# Patient Record
Sex: FEMALE
[De-identification: names, ages, dates, MRNs, and addresses within clinical notes are randomized per-mention and may not be internally consistent; named-entity substitution may affect disease eponyms.]

---

## 2019-04-01 PROBLEM — Z00.00 ENCOUNTER FOR PREVENTIVE HEALTH EXAMINATION: Status: ACTIVE | Noted: 2019-04-01

## 2019-04-02 ENCOUNTER — APPOINTMENT (OUTPATIENT)
Dept: MAMMOGRAPHY | Facility: IMAGING CENTER | Age: 54
End: 2019-04-02
Payer: COMMERCIAL

## 2019-04-02 ENCOUNTER — OUTPATIENT (OUTPATIENT)
Dept: OUTPATIENT SERVICES | Facility: HOSPITAL | Age: 54
LOS: 1 days | End: 2019-04-02
Payer: COMMERCIAL

## 2019-04-02 ENCOUNTER — APPOINTMENT (OUTPATIENT)
Dept: CT IMAGING | Facility: IMAGING CENTER | Age: 54
End: 2019-04-02
Payer: COMMERCIAL

## 2019-04-02 DIAGNOSIS — Z00.8 ENCOUNTER FOR OTHER GENERAL EXAMINATION: ICD-10-CM

## 2019-04-02 PROCEDURE — 82565 ASSAY OF CREATININE: CPT

## 2019-04-02 PROCEDURE — 74177 CT ABD & PELVIS W/CONTRAST: CPT | Mod: 26

## 2019-04-02 PROCEDURE — 74177 CT ABD & PELVIS W/CONTRAST: CPT

## 2019-04-17 ENCOUNTER — RESULT REVIEW (OUTPATIENT)
Age: 54
End: 2019-04-17

## 2020-08-04 ENCOUNTER — RESULT REVIEW (OUTPATIENT)
Age: 55
End: 2020-08-04

## 2020-12-14 ENCOUNTER — APPOINTMENT (OUTPATIENT)
Dept: INTERNAL MEDICINE | Facility: CLINIC | Age: 55
End: 2020-12-14

## 2023-02-25 ENCOUNTER — OFFICE (OUTPATIENT)
Dept: URBAN - METROPOLITAN AREA CLINIC 109 | Facility: CLINIC | Age: 58
Setting detail: OPHTHALMOLOGY
End: 2023-02-25
Payer: COMMERCIAL

## 2023-02-25 DIAGNOSIS — H00.022: ICD-10-CM

## 2023-02-25 PROCEDURE — 99213 OFFICE O/P EST LOW 20 MIN: CPT | Performed by: OPHTHALMOLOGY

## 2023-02-25 ASSESSMENT — VISUAL ACUITY
OS_BCVA: 20/40-2
OD_BCVA: 20/40-2

## 2023-02-25 ASSESSMENT — CONFRONTATIONAL VISUAL FIELD TEST (CVF)
OS_FINDINGS: FULL
OD_FINDINGS: FULL

## 2023-07-18 ENCOUNTER — OFFICE (OUTPATIENT)
Dept: URBAN - METROPOLITAN AREA CLINIC 70 | Facility: CLINIC | Age: 58
Setting detail: OPHTHALMOLOGY
End: 2023-07-18
Payer: COMMERCIAL

## 2023-07-18 DIAGNOSIS — H25.11: ICD-10-CM

## 2023-07-18 DIAGNOSIS — H25.13: ICD-10-CM

## 2023-07-18 DIAGNOSIS — H40.013: ICD-10-CM

## 2023-07-18 PROBLEM — H25.12 CATARACT SENILE NUCLEAR SCLEROSIS; RIGHT EYE, LEFT EYE, BOTH EYES: Status: ACTIVE | Noted: 2023-07-18

## 2023-07-18 PROBLEM — E11.9 DIABETES TYPE 2 NO RETINOPATHY: Status: ACTIVE | Noted: 2023-07-18

## 2023-07-18 PROCEDURE — 92014 COMPRE OPH EXAM EST PT 1/>: CPT | Performed by: OPHTHALMOLOGY

## 2023-07-18 PROCEDURE — 92136 OPHTHALMIC BIOMETRY: CPT | Performed by: OPHTHALMOLOGY

## 2023-07-18 ASSESSMENT — KERATOMETRY
OS_CYLPOWER_DEGREES: 1.5
OS_AXISANGLE_DEGREES: 159
OD_K1POWER_DIOPTERS: 42.50
OD_K1K2_AVERAGE: 42.875
OD_AXISANGLE_DEGREES: 17
OD_K2POWER_DIOPTERS: 43.25
OS_AXISANGLE2_DEGREES: 069
OD_CYLAXISANGLE_DEGREES: 107
OS_K2POWER_DIOPTERS: 43.50
OS_K1K2_AVERAGE: 42.75
OS_K1POWER_DIOPTERS: 42.00
OD_CYLPOWER_DEGREES: 0.75
OS_AXISANGLE_DEGREES: 069
OD_K2POWER_DIOPTERS: 43.25
OS_K2POWER_DIOPTERS: 43.50
OD_K1POWER_DIOPTERS: 42.50
OD_AXISANGLE_DEGREES: 107
OS_CYLAXISANGLE_DEGREES: 069
OD_AXISANGLE2_DEGREES: 107
OS_K1POWER_DIOPTERS: 42.00

## 2023-07-18 ASSESSMENT — REFRACTION_CURRENTRX
OD_CYLINDER: -0.25
OD_SPHERE: -4.75
OS_SPHERE: -5.25
OD_AXIS: 045
OS_OVR_VA: 20/
OD_OVR_VA: 20/

## 2023-07-18 ASSESSMENT — REFRACTION_AUTOREFRACTION
OD_AXIS: 085
OS_SPHERE: -4.50
OS_CYLINDER: -1.50
OS_AXIS: 118
OD_CYLINDER: -1.25
OD_SPHERE: -5.50

## 2023-07-18 ASSESSMENT — CONFRONTATIONAL VISUAL FIELD TEST (CVF)
OD_FINDINGS: FULL
OS_FINDINGS: FULL

## 2023-07-18 ASSESSMENT — SPHEQUIV_DERIVED
OS_SPHEQUIV: -5.25
OD_SPHEQUIV: -6.125

## 2023-07-18 ASSESSMENT — AXIALLENGTH_DERIVED
OS_AL: 26.166
OD_AL: 26.534

## 2023-07-18 ASSESSMENT — VISUAL ACUITY
OS_BCVA: 20/40
OD_BCVA: 20/40

## 2023-11-28 ENCOUNTER — OFFICE (OUTPATIENT)
Dept: URBAN - METROPOLITAN AREA CLINIC 104 | Facility: CLINIC | Age: 58
Setting detail: OPHTHALMOLOGY
End: 2023-11-28
Payer: COMMERCIAL

## 2023-11-28 DIAGNOSIS — H25.12: ICD-10-CM

## 2023-11-28 DIAGNOSIS — H40.013: ICD-10-CM

## 2023-11-28 DIAGNOSIS — H25.13: ICD-10-CM

## 2023-11-28 PROBLEM — H25.11 CATARACT SENILE NUCLEAR SCLEROSIS; RIGHT EYE, LEFT EYE, BOTH EYES: Status: ACTIVE | Noted: 2023-11-28

## 2023-11-28 PROCEDURE — 92014 COMPRE OPH EXAM EST PT 1/>: CPT | Performed by: SPECIALIST

## 2023-11-28 PROCEDURE — 92133 CPTRZD OPH DX IMG PST SGM ON: CPT | Performed by: SPECIALIST

## 2023-11-28 PROCEDURE — 92136 OPHTHALMIC BIOMETRY: CPT | Mod: LT | Performed by: SPECIALIST

## 2023-11-28 ASSESSMENT — REFRACTION_AUTOREFRACTION
OS_SPHERE: -4.50
OD_AXIS: 085
OD_SPHERE: -5.50
OS_AXIS: 118
OS_CYLINDER: -1.50
OD_CYLINDER: -1.25

## 2023-11-28 ASSESSMENT — REFRACTION_CURRENTRX
OD_CYLINDER: -0.25
OS_OVR_VA: 20/
OD_SPHERE: -4.75
OS_SPHERE: -5.25
OD_OVR_VA: 20/
OD_AXIS: 045

## 2023-11-28 ASSESSMENT — REFRACTION_MANIFEST
OD_CYLINDER: -0.25
OS_VA1: 20/40
OS_CYLINDER: SPHERE
OD_VA1: 20/50
OS_SPHERE: -5.25
OD_SPHERE: -4.75
OD_AXIS: 045

## 2023-11-28 ASSESSMENT — CONFRONTATIONAL VISUAL FIELD TEST (CVF)
OD_FINDINGS: FULL
OS_FINDINGS: FULL

## 2023-11-28 ASSESSMENT — SPHEQUIV_DERIVED
OD_SPHEQUIV: -6.125
OD_SPHEQUIV: -4.875
OS_SPHEQUIV: -5.25

## 2024-01-03 ENCOUNTER — NON-APPOINTMENT (OUTPATIENT)
Age: 59
End: 2024-01-03

## 2024-01-19 ENCOUNTER — RX ONLY (RX ONLY)
Age: 59
End: 2024-01-19

## 2024-01-19 ENCOUNTER — ASC (OUTPATIENT)
Dept: URBAN - METROPOLITAN AREA SURGERY 8 | Facility: SURGERY | Age: 59
Setting detail: OPHTHALMOLOGY
End: 2024-01-19
Payer: COMMERCIAL

## 2024-01-19 DIAGNOSIS — H52.212: ICD-10-CM

## 2024-01-19 DIAGNOSIS — H25.12: ICD-10-CM

## 2024-01-19 PROCEDURE — FEMTO FEMTOSECOND LASER: Mod: GY | Performed by: SPECIALIST

## 2024-01-19 PROCEDURE — 66984 XCAPSL CTRC RMVL W/O ECP: CPT | Mod: LT | Performed by: SPECIALIST

## 2024-01-20 ENCOUNTER — OFFICE (OUTPATIENT)
Dept: URBAN - METROPOLITAN AREA CLINIC 104 | Facility: CLINIC | Age: 59
Setting detail: OPHTHALMOLOGY
End: 2024-01-20
Payer: COMMERCIAL

## 2024-01-20 DIAGNOSIS — Z96.1: ICD-10-CM

## 2024-01-20 PROCEDURE — 99024 POSTOP FOLLOW-UP VISIT: CPT | Performed by: OPTOMETRIST

## 2024-01-20 ASSESSMENT — CONFRONTATIONAL VISUAL FIELD TEST (CVF)
OD_FINDINGS: FULL
OS_FINDINGS: FULL

## 2024-01-20 ASSESSMENT — SPHEQUIV_DERIVED
OS_SPHEQUIV: -5.25
OD_SPHEQUIV: -6.125
OD_SPHEQUIV: -4.875

## 2024-01-20 ASSESSMENT — REFRACTION_AUTOREFRACTION
OD_SPHERE: -5.50
OS_CYLINDER: -1.50
OD_AXIS: 085
OD_CYLINDER: -1.25
OS_AXIS: 118
OS_SPHERE: -4.50

## 2024-01-20 ASSESSMENT — REFRACTION_CURRENTRX
OD_AXIS: 045
OD_SPHERE: -4.75
OD_OVR_VA: 20/
OD_CYLINDER: -0.25
OS_SPHERE: -5.25
OS_OVR_VA: 20/

## 2024-01-20 ASSESSMENT — REFRACTION_MANIFEST
OS_SPHERE: -5.25
OD_AXIS: 045
OD_VA1: 20/50
OD_CYLINDER: -0.25
OS_CYLINDER: SPHERE
OS_VA1: 20/40
OD_SPHERE: -4.75

## 2024-01-20 ASSESSMENT — CORNEAL EDEMA CLINICAL DESCRIPTION: OS_CORNEALEDEMA: ABSENT

## 2024-01-23 ENCOUNTER — OFFICE (OUTPATIENT)
Dept: URBAN - METROPOLITAN AREA CLINIC 104 | Facility: CLINIC | Age: 59
Setting detail: OPHTHALMOLOGY
End: 2024-01-23
Payer: COMMERCIAL

## 2024-01-23 ENCOUNTER — RX ONLY (RX ONLY)
Age: 59
End: 2024-01-23

## 2024-01-23 DIAGNOSIS — H25.11: ICD-10-CM

## 2024-01-23 DIAGNOSIS — Z96.1: ICD-10-CM

## 2024-01-23 PROCEDURE — 92136 OPHTHALMIC BIOMETRY: CPT | Mod: RT | Performed by: SPECIALIST

## 2024-01-23 PROCEDURE — 99024 POSTOP FOLLOW-UP VISIT: CPT | Performed by: OPTOMETRIST

## 2024-01-23 ASSESSMENT — REFRACTION_AUTOREFRACTION
OS_AXIS: 130
OD_CYLINDER: --1.25
OD_SPHERE: -5.50
OS_SPHERE: -1.50
OS_CYLINDER: -1.00
OD_AXIS: 098

## 2024-01-23 ASSESSMENT — CONFRONTATIONAL VISUAL FIELD TEST (CVF)
OD_FINDINGS: FULL
OS_FINDINGS: FULL

## 2024-01-23 ASSESSMENT — SPHEQUIV_DERIVED: OS_SPHEQUIV: -2

## 2024-01-23 ASSESSMENT — CORNEAL EDEMA CLINICAL DESCRIPTION: OS_CORNEALEDEMA: ABSENT

## 2024-01-25 ASSESSMENT — REFRACTION_AUTOREFRACTION
OS_CYLINDER: -1.00
OD_AXIS: 098
OS_AXIS: 130
OD_CYLINDER: --1.25
OS_SPHERE: -1.50
OD_SPHERE: -5.50

## 2024-01-25 ASSESSMENT — SPHEQUIV_DERIVED: OS_SPHEQUIV: -2

## 2024-01-26 ENCOUNTER — ASC (OUTPATIENT)
Dept: URBAN - METROPOLITAN AREA SURGERY 8 | Facility: SURGERY | Age: 59
Setting detail: OPHTHALMOLOGY
End: 2024-01-26
Payer: COMMERCIAL

## 2024-01-26 DIAGNOSIS — H25.11: ICD-10-CM

## 2024-01-26 DIAGNOSIS — H52.211: ICD-10-CM

## 2024-01-26 PROCEDURE — FEMTO FEMTOSECOND LASER: Mod: GY | Performed by: SPECIALIST

## 2024-01-26 PROCEDURE — 66984 XCAPSL CTRC RMVL W/O ECP: CPT | Mod: 79,RT | Performed by: SPECIALIST

## 2024-01-27 ENCOUNTER — OFFICE (OUTPATIENT)
Dept: URBAN - METROPOLITAN AREA CLINIC 104 | Facility: CLINIC | Age: 59
Setting detail: OPHTHALMOLOGY
End: 2024-01-27
Payer: COMMERCIAL

## 2024-01-27 DIAGNOSIS — Z96.1: ICD-10-CM

## 2024-01-27 PROCEDURE — 99024 POSTOP FOLLOW-UP VISIT: CPT | Performed by: OPTOMETRIST

## 2024-01-27 ASSESSMENT — REFRACTION_AUTOREFRACTION
OD_SPHERE: -5.50
OD_AXIS: 098
OS_AXIS: 130
OD_CYLINDER: --1.25
OS_CYLINDER: -1.00
OS_SPHERE: -1.50

## 2024-01-27 ASSESSMENT — SPHEQUIV_DERIVED: OS_SPHEQUIV: -2

## 2024-01-27 ASSESSMENT — CONFRONTATIONAL VISUAL FIELD TEST (CVF)
OD_FINDINGS: FULL
OS_FINDINGS: FULL

## 2024-01-30 ENCOUNTER — OFFICE (OUTPATIENT)
Dept: URBAN - METROPOLITAN AREA CLINIC 104 | Facility: CLINIC | Age: 59
Setting detail: OPHTHALMOLOGY
End: 2024-01-30
Payer: COMMERCIAL

## 2024-01-30 DIAGNOSIS — Z96.1: ICD-10-CM

## 2024-01-30 PROCEDURE — 99024 POSTOP FOLLOW-UP VISIT: CPT | Performed by: OPTOMETRIST

## 2024-01-30 ASSESSMENT — REFRACTION_AUTOREFRACTION
OD_CYLINDER: -0.50
OS_CYLINDER: -0.75
OD_AXIS: 011
OS_AXIS: 142
OS_SPHERE: -2.00
OD_SPHERE: -1.50

## 2024-01-30 ASSESSMENT — CONFRONTATIONAL VISUAL FIELD TEST (CVF)
OD_FINDINGS: FULL
OS_FINDINGS: FULL

## 2024-01-30 ASSESSMENT — CORNEAL EDEMA - FOLDS/STRIAE: OD_FOLDSSTRIAE: 1+

## 2024-01-30 ASSESSMENT — SPHEQUIV_DERIVED
OS_SPHEQUIV: -2.375
OD_SPHEQUIV: -1.75

## 2024-02-22 ENCOUNTER — OFFICE (OUTPATIENT)
Dept: URBAN - METROPOLITAN AREA CLINIC 104 | Facility: CLINIC | Age: 59
Setting detail: OPHTHALMOLOGY
End: 2024-02-22
Payer: COMMERCIAL

## 2024-02-22 DIAGNOSIS — Z96.1: ICD-10-CM

## 2024-02-22 PROCEDURE — 99024 POSTOP FOLLOW-UP VISIT: CPT | Performed by: SPECIALIST

## 2024-02-22 ASSESSMENT — REFRACTION_MANIFEST
OD_AXIS: 035
OD_ADD: +2.00
OU_VA: 20/20-
OD_VA1: 20/20-
OS_AXIS: 115
OS_CYLINDER: -0.50
OS_VA2: 20/20(J1+)
OS_SPHERE: -1.00
OD_SPHERE: -1.00
OD_CYLINDER: -0.50
OD_VA2: 20/20(J1+)
OS_ADD: +2.00
OS_VA1: 20/20-

## 2024-02-22 ASSESSMENT — REFRACTION_AUTOREFRACTION
OS_CYLINDER: -0.50
OS_AXIS: 116
OD_CYLINDER: -0.50
OD_SPHERE: -1.75
OS_SPHERE: -1.75
OD_AXIS: 033

## 2024-02-22 ASSESSMENT — CONFRONTATIONAL VISUAL FIELD TEST (CVF)
OS_FINDINGS: FULL
OD_FINDINGS: FULL

## 2024-02-22 ASSESSMENT — SPHEQUIV_DERIVED
OD_SPHEQUIV: -1.25
OS_SPHEQUIV: -2
OD_SPHEQUIV: -2
OS_SPHEQUIV: -1.25

## 2024-02-27 ENCOUNTER — OFFICE (OUTPATIENT)
Dept: URBAN - METROPOLITAN AREA CLINIC 104 | Facility: CLINIC | Age: 59
Setting detail: OPHTHALMOLOGY
End: 2024-02-27
Payer: COMMERCIAL

## 2024-02-27 DIAGNOSIS — H10.233: ICD-10-CM

## 2024-02-27 PROCEDURE — 99213 OFFICE O/P EST LOW 20 MIN: CPT | Mod: 24 | Performed by: OPTOMETRIST

## 2024-02-27 ASSESSMENT — CONFRONTATIONAL VISUAL FIELD TEST (CVF)
OD_FINDINGS: FULL
OS_FINDINGS: FULL

## 2024-05-30 ENCOUNTER — OFFICE (OUTPATIENT)
Dept: URBAN - METROPOLITAN AREA CLINIC 104 | Facility: CLINIC | Age: 59
Setting detail: OPHTHALMOLOGY
End: 2024-05-30
Payer: COMMERCIAL

## 2024-05-30 DIAGNOSIS — E11.9: ICD-10-CM

## 2024-05-30 DIAGNOSIS — D31.32: ICD-10-CM

## 2024-05-30 DIAGNOSIS — Z96.1: ICD-10-CM

## 2024-05-30 PROCEDURE — 92250 FUNDUS PHOTOGRAPHY W/I&R: CPT | Performed by: SPECIALIST

## 2024-05-30 PROCEDURE — 92014 COMPRE OPH EXAM EST PT 1/>: CPT | Performed by: SPECIALIST

## 2024-05-30 ASSESSMENT — CONFRONTATIONAL VISUAL FIELD TEST (CVF)
OD_FINDINGS: FULL
OS_FINDINGS: FULL

## 2024-08-18 ENCOUNTER — NON-APPOINTMENT (OUTPATIENT)
Age: 59
End: 2024-08-18

## 2024-08-27 ENCOUNTER — RX RENEWAL (OUTPATIENT)
Age: 59
End: 2024-08-27

## 2024-08-27 ENCOUNTER — APPOINTMENT (OUTPATIENT)
Dept: ORTHOPEDIC SURGERY | Facility: CLINIC | Age: 59
End: 2024-08-27
Payer: COMMERCIAL

## 2024-08-27 VITALS
HEART RATE: 90 BPM | DIASTOLIC BLOOD PRESSURE: 65 MMHG | HEIGHT: 64 IN | BODY MASS INDEX: 26.29 KG/M2 | WEIGHT: 154 LBS | SYSTOLIC BLOOD PRESSURE: 113 MMHG

## 2024-08-27 DIAGNOSIS — M23.91 UNSPECIFIED INTERNAL DERANGEMENT OF RIGHT KNEE: ICD-10-CM

## 2024-08-27 PROCEDURE — 20610 DRAIN/INJ JOINT/BURSA W/O US: CPT | Mod: RT

## 2024-08-27 PROCEDURE — 99204 OFFICE O/P NEW MOD 45 MIN: CPT | Mod: 25

## 2024-08-27 RX ORDER — CELECOXIB 200 MG/1
200 CAPSULE ORAL TWICE DAILY
Qty: 180 | Refills: 0 | Status: ACTIVE | COMMUNITY
Start: 2024-08-27 | End: 1900-01-01

## 2024-08-27 NOTE — PROCEDURE
[de-identified] : 8/27/24: Right knee Injection -ketorolac The risks, benefits, and alternatives of the injection were reviewed/discussed with the patient today in office and all of their questions were answered. We discussed possible side effects and efficacy of this injection.  The patient consented to the procedure.  Prior to injection, a Time Out was conducted in accordance with [facility] policy and the site and nature of procedure verified with the patient. Site and side were verified with the patient.  The injection site was prepped with chloroprep.  Cold spray was utilized to numb the skin. Utilizing sterile technique, 1 ml of 30mg/mL Ketorolac Tromethamine, 4 ml 1% Lidocaine, and 4 mL 0.25% Bupivicaine were injected. A sterile bandage was applied. The patient tolerated the procedure well and there were no complications.

## 2024-08-27 NOTE — HISTORY OF PRESENT ILLNESS
[de-identified] : 8/27/24: Patient presents with right knee pain that began on 817.  States she slipped into a lazy river the knee was shallow but it was 2 foot drop.  Since then she has had medial knee pain.  Denies buckling.  Does have clicking.  Stairs are painful.  Taking Tylenol for the pain, cannot take NSAIDs due to stomach issues.  Allergies: NKDA Hx of DVT/PE: Denies AC: Denies Tobacco: Denies PMH: Hypertension, type 2 diabetes last A1c 8.9, hyperlipidemia

## 2024-08-27 NOTE — PHYSICAL EXAM
[de-identified] : Right lower extremity Hip: Normal ROM without pain on IR/ER Knee Inspection: Mild effusion, no erythema. Wounds: None. Alignment: neutral. Palpation: Tender to palpation medial joint line. ROM active (in degrees): 0-140, pain with deep flexion Ligamentous laxity: stable to varus stress test, stable to valgus stress test Meniscal Test: Positive McMurrays, positive Oscar. Muscle Test: good quad strength. [de-identified] : 8/27/24: Outside knee x-rays reviewed-positive effusion, joint space preserved

## 2024-08-27 NOTE — DISCUSSION/SUMMARY
[de-identified] : Right knee internal derangement  Extensive discussion of the natural history of this issue was had with the patient.  We discussed the treatment options focusing on conservative therapy which includes anti-inflammatories, physical therapy/home exercise, & activity modification.   Patient elected for a Toradol injection today. A prescription for celecoxib with the appropriate warnings for cardiac, and renal side effects was given to the patient.  Patient was instructed not to use any other NSAIDs with this medication. Patient will return if the pain returns or does not resolve.  The patient's current medication management of their orthopedic diagnosis was reviewed today: (1) We discussed a comprehensive treatment plan that included possible pharmaceutical management involving the use of prescription strength medications including but not limited to options such as oral Ibuprofen 400mg QID, once daily Meloxicam 15 mg, or 500-650 mg Tylenol versus over the counter oral medications and topical prescription NSAID Pennsaid vs over the counter Voltaren gel. (2) There is a moderate risk of morbidity with further treatment, especially from use of prescription strength medications and possible side effects of these medications which include upset stomach with oral medications, skin reactions to topical medications and cardiac/renal issues with long term use. (3) I recommended that the patient follow-up with their medical physician to discuss any significant specific potential issues with long term medication use such as interactions with current medications or with exacerbation of underlying medical comorbidities. (4) The benefits and risks associated with use of injectable, oral or topical, prescription and over the counter anti-inflammatory medications were discussed with the patient. The patient voiced understanding of the risks including but not limited to bleeding, stroke, kidney dysfunction, heart disease, and were referred to the black box warning label for further information.

## 2024-12-17 ENCOUNTER — OFFICE (OUTPATIENT)
Dept: URBAN - METROPOLITAN AREA CLINIC 104 | Facility: CLINIC | Age: 59
Setting detail: OPHTHALMOLOGY
End: 2024-12-17
Payer: COMMERCIAL

## 2024-12-17 DIAGNOSIS — Z96.1: ICD-10-CM

## 2024-12-17 DIAGNOSIS — E11.9: ICD-10-CM

## 2024-12-17 DIAGNOSIS — D31.32: ICD-10-CM

## 2024-12-17 PROCEDURE — 99213 OFFICE O/P EST LOW 20 MIN: CPT | Performed by: SPECIALIST

## 2024-12-17 ASSESSMENT — CONFRONTATIONAL VISUAL FIELD TEST (CVF)
OD_FINDINGS: FULL
OS_FINDINGS: FULL

## 2024-12-17 ASSESSMENT — VISUAL ACUITY
OS_BCVA: 20/30
OD_BCVA: 20/30

## 2024-12-17 ASSESSMENT — REFRACTION_CURRENTRX
OS_OVR_VA: 20/
OD_OVR_VA: 20/

## 2024-12-17 ASSESSMENT — TONOMETRY
OD_IOP_MMHG: 16
OS_IOP_MMHG: 16

## 2025-06-17 ENCOUNTER — OFFICE (OUTPATIENT)
Dept: URBAN - METROPOLITAN AREA CLINIC 104 | Facility: CLINIC | Age: 60
Setting detail: OPHTHALMOLOGY
End: 2025-06-17
Payer: COMMERCIAL

## 2025-06-17 DIAGNOSIS — E11.9: ICD-10-CM

## 2025-06-17 DIAGNOSIS — D31.32: ICD-10-CM

## 2025-06-17 DIAGNOSIS — Z96.1: ICD-10-CM

## 2025-06-17 DIAGNOSIS — H26.492: ICD-10-CM

## 2025-06-17 DIAGNOSIS — H52.4: ICD-10-CM

## 2025-06-17 PROCEDURE — 92015 DETERMINE REFRACTIVE STATE: CPT | Performed by: SPECIALIST

## 2025-06-17 PROCEDURE — 92014 COMPRE OPH EXAM EST PT 1/>: CPT | Performed by: SPECIALIST

## 2025-06-17 ASSESSMENT — REFRACTION_MANIFEST
OS_VA1: 20/25+
OD_SPHERE: -1.25
OS_CYLINDER: -0.25
OD_VA1: 20/25+
OD_ADD: +2.50
OD_AXIS: 40
OS_ADD: +2.50
OD_CYLINDER: -0.75
OS_SPHERE: -2.00
OS_AXIS: 160

## 2025-06-17 ASSESSMENT — REFRACTION_AUTOREFRACTION
OS_AXIS: 162
OD_AXIS: 43
OS_CYLINDER: -0.25
OD_SPHERE: -1.25
OS_SPHERE: -2.00
OD_CYLINDER: -0.75

## 2025-06-17 ASSESSMENT — VISUAL ACUITY
OD_BCVA: 20/40
OS_BCVA: 20/60

## 2025-06-17 ASSESSMENT — CONFRONTATIONAL VISUAL FIELD TEST (CVF)
OD_FINDINGS: FULL
OS_FINDINGS: FULL

## 2025-06-17 ASSESSMENT — TONOMETRY
OD_IOP_MMHG: 15
OS_IOP_MMHG: 15